# Patient Record
Sex: MALE | Race: BLACK OR AFRICAN AMERICAN | NOT HISPANIC OR LATINO | Employment: OTHER | RURAL
[De-identification: names, ages, dates, MRNs, and addresses within clinical notes are randomized per-mention and may not be internally consistent; named-entity substitution may affect disease eponyms.]

---

## 2021-07-08 ENCOUNTER — HOSPITAL ENCOUNTER (EMERGENCY)
Facility: HOSPITAL | Age: 81
Discharge: HOME | End: 2021-07-08
Payer: OTHER GOVERNMENT

## 2021-07-08 VITALS
RESPIRATION RATE: 20 BRPM | BODY MASS INDEX: 35.79 KG/M2 | HEIGHT: 70 IN | OXYGEN SATURATION: 96 % | HEART RATE: 67 BPM | SYSTOLIC BLOOD PRESSURE: 141 MMHG | DIASTOLIC BLOOD PRESSURE: 66 MMHG | WEIGHT: 250 LBS | TEMPERATURE: 98 F

## 2021-07-08 DIAGNOSIS — Z51.89 ENCOUNTER FOR WOUND RE-CHECK: Primary | ICD-10-CM

## 2021-07-08 PROCEDURE — 99281 PR EMERGENCY DEPT VISIT,LEVEL I: ICD-10-PCS | Mod: ,,, | Performed by: NURSE PRACTITIONER

## 2021-07-08 PROCEDURE — 99281 EMR DPT VST MAYX REQ PHY/QHP: CPT | Performed by: NURSE PRACTITIONER

## 2021-07-08 PROCEDURE — 99281 EMR DPT VST MAYX REQ PHY/QHP: CPT | Mod: ,,, | Performed by: NURSE PRACTITIONER

## 2021-08-11 ENCOUNTER — HOSPITAL ENCOUNTER (EMERGENCY)
Facility: HOSPITAL | Age: 81
Discharge: HOME | End: 2021-08-11
Payer: OTHER GOVERNMENT

## 2021-08-11 VITALS
RESPIRATION RATE: 20 BRPM | BODY MASS INDEX: 35.79 KG/M2 | HEIGHT: 70 IN | DIASTOLIC BLOOD PRESSURE: 71 MMHG | WEIGHT: 250 LBS | SYSTOLIC BLOOD PRESSURE: 112 MMHG | TEMPERATURE: 98 F | HEART RATE: 92 BPM | OXYGEN SATURATION: 97 %

## 2021-08-11 DIAGNOSIS — Z48.02 VISIT FOR SUTURE REMOVAL: Primary | ICD-10-CM

## 2021-08-11 PROCEDURE — 99281 EMR DPT VST MAYX REQ PHY/QHP: CPT | Mod: ,,, | Performed by: NURSE PRACTITIONER

## 2021-08-11 PROCEDURE — 99281 PR EMERGENCY DEPT VISIT,LEVEL I: ICD-10-PCS | Mod: ,,, | Performed by: NURSE PRACTITIONER

## 2021-08-11 PROCEDURE — 99281 EMR DPT VST MAYX REQ PHY/QHP: CPT

## 2024-08-01 ENCOUNTER — HOSPITAL ENCOUNTER (OUTPATIENT)
Dept: CARDIOLOGY | Facility: HOSPITAL | Age: 84
Discharge: HOME | End: 2024-08-01
Attending: INTERNAL MEDICINE
Payer: OTHER GOVERNMENT

## 2024-08-01 VITALS — HEIGHT: 70 IN | WEIGHT: 250 LBS | BODY MASS INDEX: 35.79 KG/M2

## 2024-08-01 DIAGNOSIS — R06.09 DYSPNEA ON EXERTION: ICD-10-CM

## 2024-08-01 PROCEDURE — 93306 TTE W/DOPPLER COMPLETE: CPT

## 2024-08-01 PROCEDURE — 93306 TTE W/DOPPLER COMPLETE: CPT | Mod: 26,,, | Performed by: HOSPITALIST

## 2024-08-02 LAB
AORTIC ROOT ANNULUS: 3.02 CM
AORTIC VALVE CUSP SEPERATION: 1.75 CM
AV INDEX (PROSTH): 0.91
AV MEAN GRADIENT: 3 MMHG
AV PEAK GRADIENT: 7 MMHG
AV VALVE AREA BY VELOCITY RATIO: 2.53 CM²
AV VALVE AREA: 3.23 CM²
AV VELOCITY RATIO: 0.72
BSA FOR ECHO PROCEDURE: 2.37 M2
CV ECHO LV RWT: 0.44 CM
DOP CALC AO PEAK VEL: 1.31 M/S
DOP CALC AO VTI: 27 CM
DOP CALC LVOT AREA: 3.5 CM2
DOP CALC LVOT DIAMETER: 2.12 CM
DOP CALC LVOT PEAK VEL: 0.94 M/S
DOP CALC LVOT STROKE VOLUME: 87.14 CM3
DOP CALCLVOT PEAK VEL VTI: 24.7 CM
E WAVE DECELERATION TIME: 291.59 MSEC
E/A RATIO: 0.76
E/E' RATIO: 7.16 M/S
ECHO LV POSTERIOR WALL: 0.99 CM (ref 0.6–1.1)
FRACTIONAL SHORTENING: 49 % (ref 28–44)
INTERVENTRICULAR SEPTUM: 0.92 CM (ref 0.6–1.1)
IVC DIAMETER: 1.45 CM
LEFT ATRIUM AREA SYSTOLIC (APICAL 2 CHAMBER): 14.99 CM2
LEFT ATRIUM AREA SYSTOLIC (APICAL 4 CHAMBER): 16.64 CM2
LEFT ATRIUM VOLUME INDEX MOD: 16.8 ML/M2
LEFT ATRIUM VOLUME MOD: 38.49 CM3
LEFT INTERNAL DIMENSION IN SYSTOLE: 2.31 CM (ref 2.1–4)
LEFT VENTRICLE DIASTOLIC VOLUME INDEX: 41.49 ML/M2
LEFT VENTRICLE DIASTOLIC VOLUME: 95.01 ML
LEFT VENTRICLE END SYSTOLIC VOLUME APICAL 2 CHAMBER: 35.57 ML
LEFT VENTRICLE END SYSTOLIC VOLUME APICAL 4 CHAMBER: 38.01 ML
LEFT VENTRICLE MASS INDEX: 64 G/M2
LEFT VENTRICLE SYSTOLIC VOLUME INDEX: 8 ML/M2
LEFT VENTRICLE SYSTOLIC VOLUME: 18.29 ML
LEFT VENTRICULAR INTERNAL DIMENSION IN DIASTOLE: 4.55 CM (ref 3.5–6)
LEFT VENTRICULAR MASS: 146.52 G
LV LATERAL E/E' RATIO: 6.18 M/S
LV SEPTAL E/E' RATIO: 8.5 M/S
LVED V (TEICH): 95.01 ML
LVES V (TEICH): 18.29 ML
LVOT MG: 1.48 MMHG
LVOT MV: 0.56 CM/S
MV PEAK A VEL: 0.89 M/S
MV PEAK E VEL: 0.68 M/S
MV STENOSIS PRESSURE HALF TIME: 84.56 MS
MV VALVE AREA P 1/2 METHOD: 2.6 CM2
PISA TR MAX VEL: 2.72 M/S
PV PEAK GRADIENT: 3 MMHG
PV PEAK VELOCITY: 0.92 M/S
RA VOL SYS: 62.57 ML
RIGHT ATRIAL AREA: 19.9 CM2
RIGHT ATRIUM VOLUME AREA LENGTH APICAL 4 CHAMBER: 57.9 ML
RIGHT VENTRICLE DIASTOLIC BASEL DIMENSION: 3.7 CM
RIGHT VENTRICLE DIASTOLIC LENGTH: 6.4 CM
RIGHT VENTRICLE DIASTOLIC MID DIMENSION: 2.7 CM
RIGHT VENTRICULAR LENGTH IN DIASTOLE (APICAL 4-CHAMBER VIEW): 6.43 CM
RV MID DIAMA: 2.65 CM
TDI LATERAL: 0.11 M/S
TDI SEPTAL: 0.08 M/S
TDI: 0.1 M/S
TR MAX PG: 30 MMHG
TRICUSPID ANNULAR PLANE SYSTOLIC EXCURSION: 2.24 CM
Z-SCORE OF LEFT VENTRICULAR DIMENSION IN END DIASTOLE: -6.54
Z-SCORE OF LEFT VENTRICULAR DIMENSION IN END SYSTOLE: -6.54

## 2024-09-13 DIAGNOSIS — R06.00 DYSPNEA, UNSPECIFIED: Primary | ICD-10-CM

## 2024-09-16 RX ORDER — AMOXICILLIN 875 MG/1
875 TABLET, FILM COATED ORAL 2 TIMES DAILY
COMMUNITY
Start: 2024-09-10

## 2024-09-16 RX ORDER — FINASTERIDE 5 MG/1
5 TABLET, FILM COATED ORAL DAILY
COMMUNITY
Start: 2024-05-06

## 2024-09-16 RX ORDER — TAMSULOSIN HYDROCHLORIDE 0.4 MG/1
0.4 CAPSULE ORAL DAILY
COMMUNITY
Start: 2024-07-09

## 2024-09-16 RX ORDER — OXYCODONE AND ACETAMINOPHEN 7.5; 325 MG/1; MG/1
TABLET ORAL
COMMUNITY
Start: 2024-09-10

## 2024-09-16 RX ORDER — LATANOPROST 50 UG/ML
1 SOLUTION/ DROPS OPHTHALMIC
COMMUNITY
Start: 2024-06-13

## 2024-09-16 RX ORDER — POTASSIUM CHLORIDE 750 MG/1
10 TABLET, EXTENDED RELEASE ORAL DAILY
COMMUNITY
Start: 2024-02-06

## 2024-09-16 RX ORDER — BRIMONIDINE TARTRATE 2 MG/ML
SOLUTION/ DROPS OPHTHALMIC
COMMUNITY
Start: 2024-06-13

## 2024-09-16 RX ORDER — DICLOFENAC SODIUM 10 MG/G
2 GEL TOPICAL 2 TIMES DAILY
COMMUNITY
Start: 2024-05-06

## 2024-09-16 RX ORDER — LANOLIN ALCOHOL/MO/W.PET/CERES
1000 CREAM (GRAM) TOPICAL DAILY
COMMUNITY
Start: 2024-05-06

## 2024-09-16 RX ORDER — LOSARTAN POTASSIUM 25 MG/1
25 TABLET ORAL
COMMUNITY
Start: 2024-08-08

## 2024-09-16 RX ORDER — WARFARIN SODIUM 5 MG/1
5 TABLET ORAL
COMMUNITY
Start: 2024-08-26

## 2024-09-16 RX ORDER — GUAIFENESIN 100 MG/5ML
SOLUTION ORAL
COMMUNITY
Start: 2024-05-06

## 2024-09-16 RX ORDER — HYDROCODONE BITARTRATE AND ACETAMINOPHEN 10; 325 MG/1; MG/1
TABLET ORAL
COMMUNITY
Start: 2024-08-23

## 2024-09-16 RX ORDER — MIRABEGRON 25 MG/1
25 TABLET, FILM COATED, EXTENDED RELEASE ORAL
COMMUNITY
Start: 2024-07-09

## 2024-09-16 RX ORDER — ALLOPURINOL 300 MG/1
300 TABLET ORAL DAILY
COMMUNITY
Start: 2024-02-06

## 2024-09-16 RX ORDER — MAGNESIUM OXIDE 420 MG/1
420 TABLET ORAL
COMMUNITY
Start: 2024-02-06

## 2024-09-16 RX ORDER — GABAPENTIN 600 MG/1
600 TABLET ORAL DAILY
COMMUNITY
Start: 2024-02-06

## 2024-09-16 RX ORDER — SILDENAFIL 100 MG/1
100 TABLET, FILM COATED ORAL
COMMUNITY
Start: 2024-02-06

## 2024-09-16 RX ORDER — CHLORHEXIDINE GLUCONATE ORAL RINSE 1.2 MG/ML
SOLUTION DENTAL
COMMUNITY
Start: 2024-09-10

## 2024-09-16 RX ORDER — CHOLECALCIFEROL (VITAMIN D3) 25 MCG
50 TABLET ORAL
COMMUNITY
Start: 2024-02-06

## 2024-09-16 RX ORDER — TRIAMCINOLONE ACETONIDE 1 MG/G
CREAM TOPICAL 2 TIMES DAILY
COMMUNITY
Start: 2024-05-06

## 2024-09-16 RX ORDER — ATORVASTATIN CALCIUM 40 MG/1
20 TABLET, FILM COATED ORAL
COMMUNITY
Start: 2024-05-06

## 2024-09-17 ENCOUNTER — OFFICE VISIT (OUTPATIENT)
Dept: CARDIOLOGY | Facility: CLINIC | Age: 84
End: 2024-09-17
Payer: OTHER GOVERNMENT

## 2024-09-17 ENCOUNTER — OFFICE VISIT (OUTPATIENT)
Dept: PULMONOLOGY | Facility: CLINIC | Age: 84
End: 2024-09-17
Payer: OTHER GOVERNMENT

## 2024-09-17 VITALS
BODY MASS INDEX: 35.05 KG/M2 | DIASTOLIC BLOOD PRESSURE: 62 MMHG | HEART RATE: 76 BPM | HEIGHT: 70 IN | OXYGEN SATURATION: 100 % | WEIGHT: 244.81 LBS | SYSTOLIC BLOOD PRESSURE: 112 MMHG | RESPIRATION RATE: 18 BRPM

## 2024-09-17 VITALS
OXYGEN SATURATION: 96 % | HEIGHT: 70 IN | DIASTOLIC BLOOD PRESSURE: 72 MMHG | WEIGHT: 245.81 LBS | SYSTOLIC BLOOD PRESSURE: 112 MMHG | BODY MASS INDEX: 35.19 KG/M2 | HEART RATE: 86 BPM

## 2024-09-17 DIAGNOSIS — D68.2: ICD-10-CM

## 2024-09-17 DIAGNOSIS — I10 ESSENTIAL HYPERTENSION: ICD-10-CM

## 2024-09-17 DIAGNOSIS — Z13.6 ENCOUNTER FOR SCREENING FOR CARDIOVASCULAR DISORDERS: ICD-10-CM

## 2024-09-17 DIAGNOSIS — R94.31 NONSPECIFIC ABNORMAL ELECTROCARDIOGRAM (ECG) (EKG): Primary | ICD-10-CM

## 2024-09-17 DIAGNOSIS — C61 PROSTATE CANCER: ICD-10-CM

## 2024-09-17 DIAGNOSIS — M17.12 PRIMARY OSTEOARTHRITIS OF LEFT KNEE: ICD-10-CM

## 2024-09-17 DIAGNOSIS — E66.01 MORBID OBESITY: ICD-10-CM

## 2024-09-17 DIAGNOSIS — G47.33 OBSTRUCTIVE SLEEP APNEA OF ADULT: ICD-10-CM

## 2024-09-17 DIAGNOSIS — E78.5 HYPERLIPIDEMIA, UNSPECIFIED HYPERLIPIDEMIA TYPE: ICD-10-CM

## 2024-09-17 DIAGNOSIS — R06.09 DYSPNEA ON EXERTION: Primary | ICD-10-CM

## 2024-09-17 DIAGNOSIS — F17.210 NICOTINE DEPENDENCE, CIGARETTES, UNCOMPLICATED: ICD-10-CM

## 2024-09-17 DIAGNOSIS — Z72.0 TOBACCO ABUSE DISORDER: ICD-10-CM

## 2024-09-17 DIAGNOSIS — D68.59 PROTEIN S DEFICIENCY: ICD-10-CM

## 2024-09-17 DIAGNOSIS — R06.09 DYSPNEA ON EXERTION: ICD-10-CM

## 2024-09-17 DIAGNOSIS — R91.1 SOLITARY PULMONARY NODULE: ICD-10-CM

## 2024-09-17 PROBLEM — R06.00 DYSPNEA, UNSPECIFIED: Status: ACTIVE | Noted: 2024-09-17

## 2024-09-17 PROCEDURE — 99407 BEHAV CHNG SMOKING > 10 MIN: CPT | Mod: S$PBB,,, | Performed by: STUDENT IN AN ORGANIZED HEALTH CARE EDUCATION/TRAINING PROGRAM

## 2024-09-17 PROCEDURE — 99999 PR PBB SHADOW E&M-EST. PATIENT-LVL V: CPT | Mod: PBBFAC,,, | Performed by: INTERNAL MEDICINE

## 2024-09-17 PROCEDURE — 99215 OFFICE O/P EST HI 40 MIN: CPT | Mod: PBBFAC,25 | Performed by: STUDENT IN AN ORGANIZED HEALTH CARE EDUCATION/TRAINING PROGRAM

## 2024-09-17 PROCEDURE — 99999 PR PBB SHADOW E&M-EST. PATIENT-LVL V: CPT | Mod: PBBFAC,,, | Performed by: STUDENT IN AN ORGANIZED HEALTH CARE EDUCATION/TRAINING PROGRAM

## 2024-09-17 PROCEDURE — 99215 OFFICE O/P EST HI 40 MIN: CPT | Mod: PBBFAC | Performed by: INTERNAL MEDICINE

## 2024-09-17 PROCEDURE — 99204 OFFICE O/P NEW MOD 45 MIN: CPT | Mod: S$PBB,25,, | Performed by: STUDENT IN AN ORGANIZED HEALTH CARE EDUCATION/TRAINING PROGRAM

## 2024-09-17 PROCEDURE — 99205 OFFICE O/P NEW HI 60 MIN: CPT | Mod: S$PBB,,, | Performed by: INTERNAL MEDICINE

## 2024-09-17 NOTE — PROGRESS NOTES
PCP: Telma, Primary Doctor    Referring Provider:     Subjective:   Griffin Joseph is a 84 y.o. male with hx of hypertension, sepsis, who presents for evaluation of abnormal EKG.    [facility] found to have an abnormal E[facility], referred for further evaluation.  Pt denies chest pain, pressure or shortness of breath.  He reports he becomes tired walking more than 50 feet, has to stop to rest.  He is recovering from infection in right leg, was hospitalized for same at [facility] 2/23, reports was septic, in hospital x 1 month, rehab facitlity x 1 month, then 2 months of physical tx.  He reports having to learn to walk all over again. He notes exercise tolerance had improved after that prolonged event, now feels exercise tolerance is  decreasing again.  He reports dizziness with change from lying to sitting, has to wait ten to fifteen seconds for dizziness to resolve before he can stand up.  Report bilat lower ext swelling, worsen throughout the day, improve overnight.  He has history of DVT in left leg in 1962, reports hx of bilat DVT.  He has been on warfarin since 1980.           Fhx:    Family History   Problem Relation Name Age of Onset    Hypertension Mother      Hypertension Father      Hypertension Sister      Hypertension Brother        Shx: History reviewed. No pertinent surgical history.      EKG - NSR, inverted T waves ant/lateral leads suggestive of ischemial.     ECHO - Results for orders placed during the hospital encounter of 08/01/24    Echo    Interpretation Summary    Left Ventricle: The left ventricle is normal in size. Normal wall thickness. Mild global hypokinesis present. There is normal systolic function with a visually estimated ejection fraction of 55 - 60%. There is normal diastolic function.    Right Ventricle: Normal right ventricular cavity size. Systolic function is normal.    Left Atrium: Left atrium is mildly dilated.    Aortic Valve: The aortic valve is  "a trileaflet valve. There is mild aortic valve sclerosis.    Mitral Valve: Mildly thickened posterior leaflet.    Tricuspid Valve: There is mild regurgitation.       CATH - No results found for this or any previous visit.       Stress - No results found for this or any previous visit.       No results found for: "NA", "K", "CL", "CO2", "BUN", "CREATININE", "CALCIUM", "ANIONGAP", "ESTGFRAFRICA", "EGFRNONAA"    No results found for: "CHOL"  No results found for: "HDL"  No results found for: "LDLCALC"  No results found for: "TRIG"  No results found for: "CHOLHDL"    No results found for: "WBC", "HGB", "HCT", "MCV", "PLT"        Current Outpatient Medications:     allopurinoL (ZYLOPRIM) 300 MG tablet, Take 300 mg by mouth once daily., Disp: , Rfl:     atorvastatin (LIPITOR) 40 MG tablet, Take 20 mg by mouth., Disp: , Rfl:     brimonidine 0.2% (ALPHAGAN) 0.2 % Drop, Apply to eye., Disp: , Rfl:     cyanocobalamin (VITAMIN B-12) 1000 MCG tablet, Take 1,000 mcg by mouth once daily., Disp: , Rfl:     diclofenac sodium (VOLTAREN) 1 % Gel, Apply 2 g topically 2 (two) times daily., Disp: , Rfl:     finasteride (PROSCAR) 5 mg tablet, Take 5 mg by mouth once daily., Disp: , Rfl:     gabapentin (NEURONTIN) 600 MG tablet, Take 600 mg by mouth once daily., Disp: , Rfl:     guaiFENesin 100 mg/5 ml (ROBITUSSIN) 100 mg/5 mL syrup, Take by mouth., Disp: , Rfl:     HYDROcodone-acetaminophen (NORCO)  mg per tablet, Take by mouth., Disp: , Rfl:     latanoprost 0.005 % ophthalmic solution, 1 drop., Disp: , Rfl:     losartan (COZAAR) 25 MG tablet, Take 25 mg by mouth., Disp: , Rfl:     magnesium oxide 420 mg Tab, Take 420 mg by mouth., Disp: , Rfl:     mirabegron (MYRBETRIQ) 25 mg Tb24 ER tablet, Take 25 mg by mouth., Disp: , Rfl:     oxyCODONE-acetaminophen (PERCOCET) 7.5-325 mg per tablet, Take by mouth., Disp: , Rfl:     potassium chloride SA (K-DUR,KLOR-CON M) 10 MEQ tablet, Take 10 mEq by mouth once daily., Disp: , Rfl:     " sildenafiL (VIAGRA) 100 MG tablet, Take 100 mg by mouth as needed., Disp: , Rfl:     tamsulosin (FLOMAX) 0.4 mg Cap, Take 0.4 mg by mouth once daily., Disp: , Rfl:     triamcinolone acetonide 0.1% (KENALOG) 0.1 % cream, Apply topically 2 (two) times daily., Disp: , Rfl:     vitamin D (VITAMIN D3) 1000 units Tab, Take 50 mcg by mouth., Disp: , Rfl:     warfarin (COUMADIN) 5 MG tablet, Take 5 mg by mouth., Disp: , Rfl:     amoxicillin (AMOXIL) 875 MG tablet, Take 875 mg by mouth 2 (two) times daily., Disp: , Rfl:     chlorhexidine (PERIDEX) 0.12 % solution, SMARTSIG:By Mouth, Disp: , Rfl:     Review of Systems   Constitutional: Negative for diaphoresis, malaise/fatigue, night sweats and weight gain.   HENT:  Positive for congestion. Negative for ear pain, hearing loss, nosebleeds and sore throat.    Eyes:  Negative for blurred vision, double vision, pain, photophobia and visual disturbance.        Bilat cataracts, reconstruction of right eye following trauma four years ago.   Cardiovascular:  Negative for chest pain, claudication, dyspnea on exertion, irregular heartbeat, leg swelling, near-syncope, orthopnea, palpitations and syncope.   Respiratory:  Positive for snoring. Negative for cough, shortness of breath, sleep disturbances due to breathing and wheezing.         KAELYN, has CPAP, non-compliant, uses it maybe two nights a week.    Endocrine: Negative for cold intolerance, heat intolerance, polydipsia, polyphagia and polyuria.        Borderline DM   Hematologic/Lymphatic: Negative for bleeding problem. Does not bruise/bleed easily.        Hx hypercoaguablity state,    Skin:  Negative for dry skin, flushing, itching, rash and skin cancer.   Musculoskeletal:  Positive for back pain and joint pain. Negative for arthritis, falls, muscle cramps, muscle weakness and myalgias.        Left knee has severe OA, declined for surgical intervention due to bleeding diathesis, on narcotic analgesia for left knee pain. .   Hx  "ruptured disc low back.   Hx gout, on gabapentin for unspecified indication   Gastrointestinal:  Positive for constipation. Negative for abdominal pain, change in bowel habit, diarrhea, dysphagia, heartburn, nausea and vomiting.   Genitourinary:  Negative for bladder incontinence, dysuria, flank pain, frequency and nocturia.        Hx BPH, prostate cancer, post radiation, no surgical intervention.    Neurological:  Negative for dizziness, focal weakness, headaches, light-headedness, loss of balance, numbness, paresthesias and seizures.   Psychiatric/Behavioral:  Negative for depression, memory loss and substance abuse. The patient is not nervous/anxious.    Allergic/Immunologic: Negative for environmental allergies.          Objective:   /72 (BP Location: Left arm, Patient Position: Sitting)   Pulse 86   Ht 5' 10" (1.778 m)   Wt 111.5 kg (245 lb 12.8 oz)   SpO2 96%   BMI 35.27 kg/m²       Physical Exam  Vitals and nursing note reviewed.   Constitutional:       Appearance: Normal appearance. He is obese.   HENT:      Head: Normocephalic and atraumatic.      Right Ear: External ear normal.      Left Ear: External ear normal.   Eyes:      General: No scleral icterus.        Right eye: No discharge.         Left eye: No discharge.      Extraocular Movements: Extraocular movements intact.      Conjunctiva/sclera: Conjunctivae normal.      Pupils: Pupils are equal, round, and reactive to light.   Cardiovascular:      Rate and Rhythm: Normal rate and regular rhythm.      Pulses: Normal pulses.      Heart sounds: Normal heart sounds. No murmur heard.     No friction rub. No gallop.   Pulmonary:      Effort: Pulmonary effort is normal.      Breath sounds: Wheezing present. No rhonchi or rales.   Chest:      Chest wall: No tenderness.   Abdominal:      General: Abdomen is flat. Bowel sounds are normal. There is no distension.      Palpations: Abdomen is soft.      Tenderness: There is no abdominal tenderness. There " is no guarding or rebound.   Musculoskeletal:         General: No swelling or tenderness. Normal range of motion.      Cervical back: Normal range of motion and neck supple.      Right lower leg: Edema present.      Left lower leg: Edema present.   Skin:     General: Skin is warm and dry.      Findings: No erythema or rash.   Neurological:      General: No focal deficit present.      Mental Status: He is alert and oriented to person, place, and time.      Cranial Nerves: No cranial nerve deficit.      Motor: No weakness.      Gait: Gait normal.   Psychiatric:         Mood and Affect: Mood normal.         Behavior: Behavior normal.         Thought Content: Thought content normal.         Judgment: Judgment normal.       Assessment:     1. Nonspecific abnormal electrocardiogram (ECG) (EKG)  EKG 12-lead    EKG 12-lead      2. Dyspnea, unspecified  Ambulatory referral/consult to Cardiology      3. Essential hypertension      controlled on current meds.      4. Obstructive sleep apnea of adult      non-compliant with CPAP, recommend daily use, reevalate mask fitment      5. Tobacco abuse disorder      accumulated 70 plus pack yr hx, at 10 cigs a day average, encourage smoking cessation.      6. Morbid obesity      discussed lifestyle changes,      7. Prostate cancer      in remission following radiation tx.      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 Primary osteoarthritis of left knee      narcotic dependent.      10. Hyperlipidemia, unspecified hyperlipidemia type      reports is at goal on atorvastatin.      11. Encounter for screening for cardiovascular disorders              Plan:   Nonspecific abnormal electrocardiogram (ECG) (EKG)  Abnormal EKG suggests anterior/lateral ischemic, will order gem/cardiolyte stress imaging.     Dyspnea, unspecified  Multifactorial, await stress results to eval for ischemic etiology. Has pulmonary evaluation scheduled.

## 2024-09-17 NOTE — ASSESSMENT & PLAN NOTE
Multifactorial, await stress results to eval for ischemic etiology. Has pulmonary evaluation scheduled.

## 2024-09-19 DIAGNOSIS — R06.00 DYSPNEA: Primary | ICD-10-CM

## 2024-10-01 ENCOUNTER — HOSPITAL ENCOUNTER (OUTPATIENT)
Dept: RADIOLOGY | Facility: HOSPITAL | Age: 84
Discharge: HOME | End: 2024-10-01
Attending: INTERNAL MEDICINE
Payer: OTHER GOVERNMENT

## 2024-10-01 ENCOUNTER — HOSPITAL ENCOUNTER (OUTPATIENT)
Dept: CARDIOLOGY | Facility: HOSPITAL | Age: 84
Discharge: HOME | End: 2024-10-01
Attending: INTERNAL MEDICINE
Payer: OTHER GOVERNMENT

## 2024-10-01 DIAGNOSIS — R94.31 NONSPECIFIC ABNORMAL ELECTROCARDIOGRAM (ECG) (EKG): ICD-10-CM

## 2024-10-01 DIAGNOSIS — R06.09 DYSPNEA ON EXERTION: ICD-10-CM

## 2024-10-01 DIAGNOSIS — Z13.6 ENCOUNTER FOR SCREENING FOR CARDIOVASCULAR DISORDERS: ICD-10-CM

## 2024-10-01 LAB
CV STRESS BASE HR: 63 BPM
DIASTOLIC BLOOD PRESSURE: 74 MMHG
OHS CV CPX 1 MINUTE RECOVERY HEART RATE: 67 BPM
OHS CV CPX 85 PERCENT MAX PREDICTED HEART RATE MALE: 116
OHS CV CPX ESTIMATED METS: 1
OHS CV CPX MAX PREDICTED HEART RATE: 137
OHS CV CPX PATIENT IS FEMALE: 0
OHS CV CPX PATIENT IS MALE: 1
OHS CV CPX PEAK DIASTOLIC BLOOD PRESSURE: 82 MMHG
OHS CV CPX PEAK HEAR RATE: 74 BPM
OHS CV CPX PEAK RATE PRESSURE PRODUCT: NORMAL
OHS CV CPX PEAK SYSTOLIC BLOOD PRESSURE: 175 MMHG
OHS CV CPX PERCENT MAX PREDICTED HEART RATE ACHIEVED: 54
OHS CV CPX RATE PRESSURE PRODUCT PRESENTING: NORMAL
STRESS ECHO POST EXERCISE DUR MIN: 1 MINUTES
STRESS ECHO POST EXERCISE DUR SEC: 34 SECONDS
SYSTOLIC BLOOD PRESSURE: 162 MMHG

## 2024-10-01 PROCEDURE — A9500 TC99M SESTAMIBI: HCPCS | Performed by: INTERNAL MEDICINE

## 2024-10-01 PROCEDURE — 93018 CV STRESS TEST I&R ONLY: CPT | Mod: ,,, | Performed by: INTERNAL MEDICINE

## 2024-10-01 PROCEDURE — 78452 HT MUSCLE IMAGE SPECT MULT: CPT | Mod: TC

## 2024-10-01 PROCEDURE — 93017 CV STRESS TEST TRACING ONLY: CPT

## 2024-10-01 PROCEDURE — 78452 HT MUSCLE IMAGE SPECT MULT: CPT | Mod: 26,,, | Performed by: STUDENT IN AN ORGANIZED HEALTH CARE EDUCATION/TRAINING PROGRAM

## 2024-10-01 PROCEDURE — 93016 CV STRESS TEST SUPVJ ONLY: CPT | Mod: ,,, | Performed by: INTERNAL MEDICINE

## 2024-10-01 PROCEDURE — 63600175 PHARM REV CODE 636 W HCPCS: Performed by: INTERNAL MEDICINE

## 2024-10-01 RX ORDER — TETRAKIS(2-METHOXYISOBUTYLISOCYANIDE)COPPER(I) TETRAFLUOROBORATE 1 MG/ML
33.5 INJECTION, POWDER, LYOPHILIZED, FOR SOLUTION INTRAVENOUS
Status: COMPLETED | OUTPATIENT
Start: 2024-10-01 | End: 2024-10-01

## 2024-10-01 RX ORDER — REGADENOSON 0.08 MG/ML
0.4 INJECTION, SOLUTION INTRAVENOUS
Status: COMPLETED | OUTPATIENT
Start: 2024-10-01 | End: 2024-10-01

## 2024-10-01 RX ORDER — TETRAKIS(2-METHOXYISOBUTYLISOCYANIDE)COPPER(I) TETRAFLUOROBORATE 1 MG/ML
10.4 INJECTION, POWDER, LYOPHILIZED, FOR SOLUTION INTRAVENOUS
Status: COMPLETED | OUTPATIENT
Start: 2024-10-01 | End: 2024-10-01

## 2024-10-01 RX ADMIN — KIT FOR THE PREPARATION OF TECHNETIUM TC99M SESTAMIBI 33.5 MILLICURIE: 1 INJECTION, POWDER, LYOPHILIZED, FOR SOLUTION PARENTERAL at 10:10

## 2024-10-01 RX ADMIN — KIT FOR THE PREPARATION OF TECHNETIUM TC99M SESTAMIBI 10.4 MILLICURIE: 1 INJECTION, POWDER, LYOPHILIZED, FOR SOLUTION PARENTERAL at 09:10

## 2024-10-01 RX ADMIN — REGADENOSON 0.4 MG: 0.08 INJECTION, SOLUTION INTRAVENOUS at 10:10

## 2024-10-04 ENCOUNTER — TELEPHONE (OUTPATIENT)
Dept: CARDIOLOGY | Facility: CLINIC | Age: 84
End: 2024-10-04
Payer: OTHER GOVERNMENT

## 2024-10-04 NOTE — TELEPHONE ENCOUNTER
Spoke to the patient on today regarding test results.    Per Dr. Wong notified the patients that his test results were normal and that he can keep his follow up appointment.    Patient verbalized understanding.----- Message from Les Wong DO sent at 10/4/2024 10:55 AM CDT -----  Regarding: normal stress test    ----- Message -----  From: Jake, Rad Results In  Sent: 10/4/2024   8:27 AM CDT  To: Les Wong DO

## 2024-10-28 ENCOUNTER — TELEPHONE (OUTPATIENT)
Dept: CARDIOLOGY | Facility: CLINIC | Age: 84
End: 2024-10-28
Payer: OTHER GOVERNMENT

## 2024-10-30 ENCOUNTER — CLINICAL SUPPORT (OUTPATIENT)
Dept: PULMONOLOGY | Facility: HOSPITAL | Age: 84
End: 2024-10-30
Attending: NURSE PRACTITIONER
Payer: OTHER GOVERNMENT

## 2024-10-30 ENCOUNTER — CLINICAL SUPPORT (OUTPATIENT)
Dept: PULMONOLOGY | Facility: HOSPITAL | Age: 84
End: 2024-10-30
Attending: STUDENT IN AN ORGANIZED HEALTH CARE EDUCATION/TRAINING PROGRAM
Payer: OTHER GOVERNMENT

## 2024-10-30 ENCOUNTER — OFFICE VISIT (OUTPATIENT)
Dept: CARDIOLOGY | Facility: CLINIC | Age: 84
End: 2024-10-30
Payer: OTHER GOVERNMENT

## 2024-10-30 VITALS
SYSTOLIC BLOOD PRESSURE: 122 MMHG | HEART RATE: 76 BPM | DIASTOLIC BLOOD PRESSURE: 58 MMHG | HEIGHT: 70 IN | OXYGEN SATURATION: 95 % | BODY MASS INDEX: 35.93 KG/M2 | WEIGHT: 251 LBS

## 2024-10-30 VITALS — HEIGHT: 70 IN | BODY MASS INDEX: 35.93 KG/M2 | OXYGEN SATURATION: 98 % | WEIGHT: 251 LBS

## 2024-10-30 DIAGNOSIS — R91.1 SOLITARY PULMONARY NODULE: ICD-10-CM

## 2024-10-30 DIAGNOSIS — R94.31 NONSPECIFIC ABNORMAL ELECTROCARDIOGRAM (ECG) (EKG): ICD-10-CM

## 2024-10-30 DIAGNOSIS — E78.5 HYPERLIPIDEMIA, UNSPECIFIED HYPERLIPIDEMIA TYPE: ICD-10-CM

## 2024-10-30 DIAGNOSIS — I10 ESSENTIAL HYPERTENSION: Primary | ICD-10-CM

## 2024-10-30 PROCEDURE — 27100098 HC SPACER

## 2024-10-30 PROCEDURE — 99213 OFFICE O/P EST LOW 20 MIN: CPT | Mod: S$PBB,,, | Performed by: NURSE PRACTITIONER

## 2024-10-30 PROCEDURE — 94726 PLETHYSMOGRAPHY LUNG VOLUMES: CPT

## 2024-10-30 PROCEDURE — 94060 EVALUATION OF WHEEZING: CPT

## 2024-10-30 PROCEDURE — 94618 PULMONARY STRESS TESTING: CPT

## 2024-10-30 PROCEDURE — 99215 OFFICE O/P EST HI 40 MIN: CPT | Mod: PBBFAC | Performed by: NURSE PRACTITIONER

## 2024-10-30 PROCEDURE — 94729 DIFFUSING CAPACITY: CPT

## 2024-10-30 PROCEDURE — 99999 PR PBB SHADOW E&M-EST. PATIENT-LVL V: CPT | Mod: PBBFAC,,, | Performed by: NURSE PRACTITIONER

## 2024-11-25 ENCOUNTER — TELEPHONE (OUTPATIENT)
Dept: PULMONOLOGY | Facility: CLINIC | Age: 84
End: 2024-11-25
Payer: OTHER GOVERNMENT

## 2024-11-25 NOTE — TELEPHONE ENCOUNTER
----- Message from Timothy Husain MD sent at 11/24/2024  6:21 PM CST -----  Please have the patient come and follow up with me in February of 2025.  He had pulmonary function tests done at the end of October of 2024 but missed his follow up appointment with me.    Thank you,     AK

## 2024-11-25 NOTE — TELEPHONE ENCOUNTER
Scheduled pt for February follow up. Letter mailed to pt. Cannot scheduled ECHO until after the first of the year, made reminder to schedule after the first and will mail to pt

## 2024-11-25 NOTE — PROGRESS NOTES
Patient Name: Griffin Joseph   Primary Care Provider: Telma, Primary Doctor   Date of Service:  9/17/24  Reason for Referral:  Dyspnea with exertion    Chief Complaint: [facility] ([facility] Abnormal findings on imaging. Went to [facility] about a month ago and had CT done. He is not sure when or why. No inhalers No PFT. Sometimes when he walks he gets sob, he said he is just lazy. )      Subjective:      Griffin Joseph is 84 y.o. male with past medical history significant for hypertension , protein S deficiency, DVT who presents as a referral from outside hospital secondary to abnormal imaging.     Initial clinic visit 9/17/24    I had the opportunity reviewed the patient's CT chest which showed small subcentimeter (2 mm or less) pulmonary nodules, on the right lung.  Per the official radiology report main pulmonary artery appeared to be enlarged with consideration of possible pulmonary hypertension.  When I measured this, the ratio compared to the aorta was less than 1.  Please see screen shot below.      He had an echocardiogram performed on 8/1/24 which showed a normal right ventricular size and function with normal ejection fraction and diastolic function.  Pulmonary artery systolic pressure was not commented on.  TAPSE was at 2.24 cm.    He is dyspneic with exertion.  He smokes half a pack per day.  He has status post hospitalization at outside hospital for infection in right leg followed by physical therapy and rehabilitation for the last 3-4 months.  He has a history of protein S deficiency and DVT.         Assessment and Plan      Dyspnea with exertion   Pulmonary nodules   Protein S deficiency      Assessment:  Patient states that he becomes dyspneic with exertion after walking for considerable amount of time, which he attributes more to deconditioning from his recent hospitalization but is undergoing physical therapy.  He denies any chest pain at rest or with ambulation and gets only dyspneic with longer  distances.  I reviewed his CT scan with subcentimeter lung nodules present bilaterally.  He continues to smoke at this time.  He is on anticoagulation with warfarin for history of protein S deficiency and prior history of DVT.  I reviewed his CT chest and (refer to above) to my review his pulmonary artery does not look significantly enlarged.  His echocardiogram shows a normal RV function and size.  Pulmonary artery systolic pressures were not commented on the echocardiogram.  He is hemodynamically stable at 100% SpO2 on room air and not tachycardic or hypotensive during his visit today.    Plan  Repeat echocardiogram   Repeat CT chest without contrast  No indication for inhalers at this time   Patient out with the window for lung cancer screening/low-dose CT   Ordered:Complete PFTs with pre and post bronchodilator testing and 6 minute walk test (pulmonary stress test)    Counseled to call the clinic or go to the emergency department/call 911 in the event of worsening symptoms or any other red flag signs/symptoms as explained to the patient in detail        Nicotine dependence, cigarettes   Tobacco cessation counseling      Assessment:  Patient currently smokes 0.5 pack per day.  We spent a significant amount of time talking about tobacco cessation, the factors that keep the patient smoking such as habit and cravings.  We also discussed in details ways to stop smoking after the patient expressed a strong desire to quit. The patient is not interested in nicotine replacement therapy at this time.  Duration of counseling 11 minutes.    Plan  We will continue to monitor progress on next visit   Nicotine replacement therapy is not prescribed at this time (patches and p.r.n. lozenges)       Follow-up   Follow-up  after testing as above    Timothy Husain MD  Interventional Pulmonary and Critical Care  [facility]      Problem List Items Addressed This Visit    None  Visit Diagnoses       Solitary pulmonary  nodule    -  Primary    Relevant Orders    CT Chest Without Contrast    Complete PFT with bronchodilator    Stress test, pulmonary                Past Medical History:   Diagnosis Date    Diabetes mellitus, type 2       Past Surgical History:   Procedure Laterality Date    KNEE SURGERY Left     TONSILLECTOMY  1968    VEIN SURGERY Left      Family History   Problem Relation Name Age of Onset    Hypertension Mother      Hypertension Father      Hypertension Sister      Hypertension Brother       Review of patient's allergies indicates:   Allergen Reactions    Aspirin      Note: - [employer] 09/25/2019    Shellfish containing products      Note: - [employer] 09/25/2019      Social History     Tobacco Use    Smoking status: Some Days     Current packs/day: 0.50     Types: Cigarettes    Smokeless tobacco: Never   Substance Use Topics    Alcohol use: Yes     Alcohol/week: 3.0 standard drinks of alcohol     Types: 3 Cans of beer per week    Drug use: Never      Review of Systems       Objective:      Physical Exam  Constitutional:       General: He is not in acute distress.     Appearance: Normal appearance. He is normal weight. He is not ill-appearing or diaphoretic.   HENT:      Head: Normocephalic and atraumatic.      Nose: No congestion or rhinorrhea.      Mouth/Throat:      Mouth: Mucous membranes are moist.   Cardiovascular:      Rate and Rhythm: Normal rate and regular rhythm.      Pulses: Normal pulses.      Heart sounds: Normal heart sounds.   Pulmonary:      Effort: Pulmonary effort is normal. No respiratory distress.      Breath sounds: Normal breath sounds. No stridor. No wheezing, rhonchi or rales.   Chest:      Chest wall: No tenderness.   Abdominal:      General: Abdomen is flat.   Musculoskeletal:      Cervical back: Normal range of motion. No rigidity.      Right lower leg: Edema present.      Left lower leg: Edema present.      Comments: Trace bilateral lower extremity edema present   Skin:     General: Skin  "is warm.      Findings: No erythema.   Neurological:      General: No focal deficit present.      Mental Status: He is alert and oriented to person, place, and time. Mental status is at baseline.   Psychiatric:         Mood and Affect: Mood normal.         Behavior: Behavior normal.         Thought Content: Thought content normal.                10/30/2024    11:00 AM 10/30/2024    10:40 AM 10/30/2024     9:37 AM 9/17/2024    10:11 AM 9/17/2024     8:51 AM 8/1/2024     2:08 PM 8/11/2021     3:10 PM   Pulmonary Function Tests   FVC 2.5 liters         FVC% 66         FEV1 1.62 liters         FEV1% 58         FEF 25-75 0.75         FEF 25-75% 39         TLC (liters) 6.39 liters         TLC% 90         RV 3.8         RV% 139         DLCO (ml/mmHg sec) 13.5 ml/mmHg sec         DLCO% 56         Peak Flow 321 L/min         FiO2 (%) 21 %         SpO2 98 %  95 % 100 % 96 %  97 %   Ordering Provider  Timothy Husain MD        Performing nurse/tech/RT  Joana Allan CRT        Diagnosis  Shortness of Breath        Height  5' 10" (1.778 m) 5' 10" (1.778 m) 5' 10" (1.778 m) 5' 10" (1.778 m) 5' 10" (1.778 m) 5' 10" (1.778 m)   Weight  113.9 kg (251 lb) 113.9 kg (251 lb) 111 kg (244 lb 12.8 oz) 111.5 kg (245 lb 12.8 oz) 113.4 kg (250 lb) 113.4 kg (250 lb)   BMI (Calculated)  36 36 35.1 35.3 35.9 35.9   6MWT Status  completed without stopping        Patient Reported  Other (Comment)        Was O2 used?  No        6MW Distance walked (feet)  704 feet        Distance walked (meters)  214.58 meters        Did patient stop?  No        Type of assistive device(s) used?  a wheelchair        Oxygen Saturation  97 %        Supplemental Oxygen  Room Air        Heart Rate  71 bpm        Blood Pressure  134/88        Meagan Dyspnea Rating   heavy        Oxygen Saturation  97 %        Supplemental Oxygen  Room Air        Heart Rate  107 bpm        Blood Pressure  146/75        Meagan Dyspnea Rating   very heavy              Outpatient Encounter " Medications as of 9/17/2024   Medication Sig Dispense Refill    allopurinoL (ZYLOPRIM) 300 MG tablet Take 300 mg by mouth once daily.      amoxicillin (AMOXIL) 875 MG tablet Take 875 mg by mouth 2 (two) times daily.      atorvastatin (LIPITOR) 40 MG tablet Take 20 mg by mouth.      brimonidine 0.2% (ALPHAGAN) 0.2 % Drop Apply to eye.      chlorhexidine (PERIDEX) 0.12 % solution SMARTSIG:By Mouth      cyanocobalamin (VITAMIN B-12) 1000 MCG tablet Take 1,000 mcg by mouth once daily.      diclofenac sodium (VOLTAREN) 1 % Gel Apply 2 g topically 2 (two) times daily.      finasteride (PROSCAR) 5 mg tablet Take 5 mg by mouth once daily.      gabapentin (NEURONTIN) 600 MG tablet Take 600 mg by mouth once daily.      guaiFENesin 100 mg/5 ml (ROBITUSSIN) 100 mg/5 mL syrup Take by mouth.      HYDROcodone-acetaminophen (NORCO)  mg per tablet Take by mouth.      latanoprost 0.005 % ophthalmic solution 1 drop.      magnesium oxide 420 mg Tab Take 420 mg by mouth.      mirabegron (MYRBETRIQ) 25 mg Tb24 ER tablet Take 25 mg by mouth.      potassium chloride SA (K-DUR,KLOR-CON M) 10 MEQ tablet Take 10 mEq by mouth once daily.      sildenafiL (VIAGRA) 100 MG tablet Take 100 mg by mouth as needed.      tamsulosin (FLOMAX) 0.4 mg Cap Take 0.4 mg by mouth once daily.      triamcinolone acetonide 0.1% (KENALOG) 0.1 % cream Apply topically 2 (two) times daily.      vitamin D (VITAMIN D3) 1000 units Tab Take 50 mcg by mouth.      warfarin (COUMADIN) 5 MG tablet Take 5 mg by mouth.      losartan (COZAAR) 25 MG tablet Take 25 mg by mouth. (Patient not taking: Reported on 9/17/2024)      oxyCODONE-acetaminophen (PERCOCET) 7.5-325 mg per tablet Take by mouth. (Patient not taking: Reported on 9/17/2024)       No facility-administered encounter medications on file as of 9/17/2024.       Assessment & Plan    As above                                          Orders Placed This Encounter   Procedures    CT Chest Without Contrast     Standing  Status:   Future     Standing Expiration Date:   9/17/2025     Order Specific Question:   May the Radiologist modify the order per protocol to meet the clinical needs of the patient?     Answer:   Yes    Complete PFT with bronchodilator     Standing Status:   Future     Number of Occurrences:   1     Standing Expiration Date:   9/17/2025     Order Specific Question:   Release to patient     Answer:   Immediate    Stress test, pulmonary     Standing Status:   Future     Number of Occurrences:   1     Standing Expiration Date:   9/17/2025     Order Specific Question:   Reason for study     Answer:   Oxygen prescription

## 2024-11-25 NOTE — TELEPHONE ENCOUNTER
----- Message from Timothy Husain MD sent at 11/24/2024  6:43 PM CST -----  Please also make sure that the patient gets his echocardiogram done before his next visit with me.      Thank you,     AK

## 2025-02-28 ENCOUNTER — OFFICE VISIT (OUTPATIENT)
Dept: PULMONOLOGY | Facility: CLINIC | Age: 85
End: 2025-02-28
Payer: OTHER GOVERNMENT

## 2025-02-28 VITALS
DIASTOLIC BLOOD PRESSURE: 70 MMHG | RESPIRATION RATE: 16 BRPM | SYSTOLIC BLOOD PRESSURE: 122 MMHG | OXYGEN SATURATION: 96 % | HEART RATE: 88 BPM | HEIGHT: 70 IN | BODY MASS INDEX: 36.01 KG/M2

## 2025-02-28 DIAGNOSIS — F17.210 NICOTINE DEPENDENCE, CIGARETTES, UNCOMPLICATED: ICD-10-CM

## 2025-02-28 DIAGNOSIS — J44.89 ASTHMA-COPD OVERLAP SYNDROME: Primary | ICD-10-CM

## 2025-02-28 DIAGNOSIS — R91.1 PULMONARY NODULE: ICD-10-CM

## 2025-02-28 DIAGNOSIS — R06.00 DYSPNEA, UNSPECIFIED TYPE: ICD-10-CM

## 2025-02-28 DIAGNOSIS — D68.59 PROTEIN S DEFICIENCY: ICD-10-CM

## 2025-02-28 PROCEDURE — 99214 OFFICE O/P EST MOD 30 MIN: CPT | Mod: 25,S$PBB,, | Performed by: STUDENT IN AN ORGANIZED HEALTH CARE EDUCATION/TRAINING PROGRAM

## 2025-02-28 PROCEDURE — 99999 PR PBB SHADOW E&M-EST. PATIENT-LVL V: CPT | Mod: PBBFAC,,, | Performed by: STUDENT IN AN ORGANIZED HEALTH CARE EDUCATION/TRAINING PROGRAM

## 2025-02-28 PROCEDURE — 99215 OFFICE O/P EST HI 40 MIN: CPT | Mod: PBBFAC | Performed by: STUDENT IN AN ORGANIZED HEALTH CARE EDUCATION/TRAINING PROGRAM

## 2025-02-28 PROCEDURE — 99407 BEHAV CHNG SMOKING > 10 MIN: CPT | Mod: S$PBB,,, | Performed by: STUDENT IN AN ORGANIZED HEALTH CARE EDUCATION/TRAINING PROGRAM

## 2025-02-28 RX ORDER — FLUTICASONE FUROATE, UMECLIDINIUM BROMIDE AND VILANTEROL TRIFENATATE 200; 62.5; 25 UG/1; UG/1; UG/1
1 POWDER RESPIRATORY (INHALATION) DAILY
Qty: 28 EACH | Refills: 11 | Status: SHIPPED | OUTPATIENT
Start: 2025-02-28

## 2025-02-28 NOTE — PROGRESS NOTES
Patient Name: Griffin Joseph   Primary Care Provider: Telma, Primary Doctor   Date of Service:  9/17/24  Reason for Referral:  Dyspnea with exertion    Chief Complaint: Abnormal images ([facility]normal findings on imaging. Went to [facility] about a month ago and had CT done. He is not sure when or why. No inhalers No PFT. Sometimes when he walks he gets sob, he said he is just lazy. )      Subjective:      Griffin Joseph is 84 y.o. male with past medical history significant for hypertension , protein S deficiency, DVT who presents as a referral from outside hospital secondary to abnormal imaging.       Follow up clinic visit 2/28/25   The patient did not have his CT chest without contrast or echocardiogram repeat performed since his last visit with me.  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  Dyspnea with exertion.  Have ordered repeat CT chest and echocardiogram and prescribed him triple inhaled therapy.    Initial clinic visit 9/17/24    I had the opportunity reviewed the patient's CT chest which showed small subcentimeter (2 mm or less) pulmonary nodules, on the right lung.  Per the official radiology report main pulmonary artery appeared to be enlarged with consideration of possible pulmonary hypertension.  When I measured this, the ratio compared to the aorta was less than 1.  Please see screen shot below.      He had an echocardiogram performed on 8/1/24 which showed a normal right ventricular size and function with normal ejection fraction and diastolic function.  Pulmonary artery systolic pressure was not commented on.  TAPSE was at 2.24 cm.    He is dyspneic with exertion.  He smokes half a pack per day.  He has status post hospitalization at outside hospital for infection in right leg followed by physical therapy and rehabilitation for the last 3-4 months.  He has a history of protein S deficiency and  DVT.         Assessment and Plan      Dyspnea with exertion   Pulmonary nodules   Protein S deficiency  Asthma/COPD overlap syndrome      Assessment:  Asthma/COPD overlap syndrome, prescribed Trelegy Ellipta.  Noncompliant with the previous echocardiogram and CT chest which has been reordered.  Patient stable at this time.    Plan  Repeat echocardiogram   Repeat CT chest without contrast  Prescribed Trelegy inhaler  Counseled to call the clinic or go to the emergency department/call 911 in the event of worsening symptoms or any other red flag signs/symptoms as explained to the patient in detail        Nicotine dependence, cigarettes   Tobacco cessation counseling      Assessment:  Patient currently smokes 0.5 pack per day.  We spent a significant amount of time talking about tobacco cessation, the factors that keep the patient smoking such as habit and cravings.  We also discussed in details ways to stop smoking after the patient expressed a strong desire to quit. The patient is not interested in nicotine replacement therapy at this time.  Duration of counseling 11 minutes.    Plan  We will continue to monitor progress on next visit   Nicotine replacement therapy is not prescribed at this time (patches and p.r.n. lozenges)       Follow-up   Follow-up  after testing as above    Timothy Husain MD  Interventional Pulmonary and Critical Care  [facility]      Problem List Items Addressed This Visit    None            Past Medical History:   Diagnosis Date    Diabetes mellitus, type 2       Past Surgical History:   Procedure Laterality Date    KNEE SURGERY Left     TONSILLECTOMY  1968    VEIN SURGERY Left      Family History   Problem Relation Name Age of Onset    Hypertension Mother      Hypertension Father      Hypertension Sister      Hypertension Brother       Review of patient's allergies indicates:   Allergen Reactions    Aspirin      Note: - [employer] 09/25/2019    Shellfish containing products       Note: - [employer] 09/25/2019      Social History     Tobacco Use    Smoking status: Some Days     Current packs/day: 0.50     Types: Cigarettes    Smokeless tobacco: Never   Substance Use Topics    Alcohol use: Yes     Alcohol/week: 3.0 standard drinks of alcohol     Types: 3 Cans of beer per week    Drug use: Never      Review of Systems       Objective:      Physical Exam  Constitutional:       General: He is not in acute distress.     Appearance: Normal appearance. He is normal weight. He is not ill-appearing or diaphoretic.   HENT:      Head: Normocephalic and atraumatic.      Nose: No congestion or rhinorrhea.      Mouth/Throat:      Mouth: Mucous membranes are moist.   Cardiovascular:      Rate and Rhythm: Normal rate and regular rhythm.      Pulses: Normal pulses.      Heart sounds: Normal heart sounds.   Pulmonary:      Effort: Pulmonary effort is normal. No respiratory distress.      Breath sounds: Normal breath sounds. No stridor. No wheezing, rhonchi or rales.   Chest:      Chest wall: No tenderness.   Abdominal:      General: Abdomen is flat.   Musculoskeletal:      Cervical back: Normal range of motion. No rigidity.      Right lower leg: Edema present.      Left lower leg: Edema present.      Comments: Trace bilateral lower extremity edema present   Skin:     General: Skin is warm.      Findings: No erythema.   Neurological:      General: No focal deficit present.      Mental Status: He is alert and oriented to person, place, and time. Mental status is at baseline.   Psychiatric:         Mood and Affect: Mood normal.         Behavior: Behavior normal.         Thought Content: Thought content normal.                2/28/2025    10:44 AM 10/30/2024    11:00 AM 10/30/2024    10:40 AM 10/30/2024     9:37 AM 9/17/2024    10:11 AM 9/17/2024     8:51 AM 8/1/2024     2:08 PM   Pulmonary Function Tests   FVC  2.5 liters        FVC%  66        FEV1  1.62 liters        FEV1%  58        FEF 25-75  0.75        FEF  "25-75%  39        TLC (liters)  6.39 liters        TLC%  90        RV  3.8        RV%  139        DLCO (ml/mmHg sec)  13.5 ml/mmHg sec        DLCO%  56        Peak Flow  321 L/min        FiO2 (%)  21 %        SpO2 96 % 98 %  95 % 100 % 96 %    Ordering Provider   Timothy Husain MD       Performing nurse/tech/RT   Joana Allan CRT       Diagnosis   Shortness of Breath       Height 5' 10" (1.778 m)  5' 10" (1.778 m) 5' 10" (1.778 m) 5' 10" (1.778 m) 5' 10" (1.778 m) 5' 10" (1.778 m)   Weight   113.9 kg (251 lb) 113.9 kg (251 lb) 111 kg (244 lb 12.8 oz) 111.5 kg (245 lb 12.8 oz) 113.4 kg (250 lb)   BMI (Calculated)   36 36 35.1 35.3 35.9   6MWT Status   completed without stopping       Patient Reported   Other (Comment)       Was O2 used?   No       6MW Distance walked (feet)   704 feet       Distance walked (meters)   214.58 meters       Did patient stop?   No       Type of assistive device(s) used?   a wheelchair       Oxygen Saturation   97 %       Supplemental Oxygen   Room Air       Heart Rate   71 bpm       Blood Pressure   134/88       Meagan Dyspnea Rating    heavy       Oxygen Saturation   97 %       Supplemental Oxygen   Room Air       Heart Rate   107 bpm       Blood Pressure   146/75       Meagan Dyspnea Rating    very heavy             Outpatient Encounter Medications as of 2/28/2025   Medication Sig Dispense Refill    allopurinoL (ZYLOPRIM) 300 MG tablet Take 300 mg by mouth once daily.      atorvastatin (LIPITOR) 40 MG tablet Take 20 mg by mouth.      brimonidine 0.2% (ALPHAGAN) 0.2 % Drop Apply to eye.      cyanocobalamin (VITAMIN B-12) 1000 MCG tablet Take 1,000 mcg by mouth once daily.      diclofenac sodium (VOLTAREN) 1 % Gel Apply 2 g topically 2 (two) times daily.      finasteride (PROSCAR) 5 mg tablet Take 5 mg by mouth once daily.      gabapentin (NEURONTIN) 600 MG tablet Take 600 mg by mouth once daily.      guaiFENesin 100 mg/5 ml (ROBITUSSIN) 100 mg/5 mL syrup Take by mouth.      " HYDROcodone-acetaminophen (NORCO)  mg per tablet Take by mouth.      latanoprost 0.005 % ophthalmic solution 1 drop.      losartan (COZAAR) 25 MG tablet Take 25 mg by mouth.      magnesium oxide 420 mg Tab Take 420 mg by mouth.      mirabegron (MYRBETRIQ) 25 mg Tb24 ER tablet Take 25 mg by mouth.      oxyCODONE-acetaminophen (PERCOCET) 7.5-325 mg per tablet Take by mouth.      potassium chloride SA (K-DUR,KLOR-CON M) 10 MEQ tablet Take 10 mEq by mouth once daily.      tamsulosin (FLOMAX) 0.4 mg Cap Take 0.4 mg by mouth once daily.      triamcinolone acetonide 0.1% (KENALOG) 0.1 % cream Apply topically 2 (two) times daily.      vitamin D (VITAMIN D3) 1000 units Tab Take 50 mcg by mouth.      warfarin (COUMADIN) 5 MG tablet Take 5 mg by mouth.      amoxicillin (AMOXIL) 875 MG tablet Take 875 mg by mouth 2 (two) times daily. (Patient not taking: Reported on 2/28/2025)      chlorhexidine (PERIDEX) 0.12 % solution SMARTSIG:By Mouth (Patient not taking: Reported on 2/28/2025)      sildenafiL (VIAGRA) 100 MG tablet Take 100 mg by mouth as needed. (Patient not taking: Reported on 2/28/2025)       No facility-administered encounter medications on file as of 2/28/2025.       Assessment & Plan    As above                                          No orders of the defined types were placed in this encounter.